# Patient Record
Sex: FEMALE | Race: WHITE
[De-identification: names, ages, dates, MRNs, and addresses within clinical notes are randomized per-mention and may not be internally consistent; named-entity substitution may affect disease eponyms.]

---

## 2020-02-17 ENCOUNTER — HOSPITAL ENCOUNTER (EMERGENCY)
Dept: HOSPITAL 88 - ER | Age: 30
Discharge: HOME | End: 2020-02-17
Payer: SELF-PAY

## 2020-02-17 VITALS — HEIGHT: 65 IN | BODY MASS INDEX: 26.99 KG/M2 | WEIGHT: 162 LBS

## 2020-02-17 DIAGNOSIS — R07.89: Primary | ICD-10-CM

## 2020-02-17 DIAGNOSIS — R01.1: ICD-10-CM

## 2020-02-17 DIAGNOSIS — K51.90: ICD-10-CM

## 2020-02-17 LAB
ALBUMIN SERPL-MCNC: 4.2 G/DL (ref 3.5–5)
ALBUMIN/GLOB SERPL: 1.4 {RATIO} (ref 0.8–2)
ALP SERPL-CCNC: 88 IU/L (ref 40–150)
ALT SERPL-CCNC: 29 IU/L (ref 0–55)
AMPHETAMINES UR QL SCN>1000 NG/ML: NEGATIVE
ANION GAP SERPL CALC-SCNC: 13.9 MMOL/L (ref 8–16)
BASOPHILS # BLD AUTO: 0.1 10*3/UL (ref 0–0.1)
BASOPHILS NFR BLD AUTO: 0.5 % (ref 0–1)
BENZODIAZ UR QL SCN: NEGATIVE
BUN SERPL-MCNC: 13 MG/DL (ref 7–26)
BUN/CREAT SERPL: 18 (ref 6–25)
CALCIUM SERPL-MCNC: 9.9 MG/DL (ref 8.4–10.2)
CHLORIDE SERPL-SCNC: 104 MMOL/L (ref 98–107)
CK MB SERPL-MCNC: 1.3 NG/ML (ref 0–5)
CK SERPL-CCNC: 98 IU/L (ref 29–168)
CO2 SERPL-SCNC: 27 MMOL/L (ref 22–29)
DEPRECATED NEUTROPHILS # BLD AUTO: 6.6 10*3/UL (ref 2.1–6.9)
EGFRCR SERPLBLD CKD-EPI 2021: > 60 ML/MIN (ref 60–?)
EOSINOPHIL # BLD AUTO: 0.1 10*3/UL (ref 0–0.4)
EOSINOPHIL NFR BLD AUTO: 1 % (ref 0–6)
ERYTHROCYTE [DISTWIDTH] IN CORD BLOOD: 12.1 % (ref 11.7–14.4)
GLOBULIN PLAS-MCNC: 3.1 G/DL (ref 2.3–3.5)
GLUCOSE SERPLBLD-MCNC: 94 MG/DL (ref 74–118)
HCT VFR BLD AUTO: 39.7 % (ref 34.2–44.1)
HGB BLD-MCNC: 13.1 G/DL (ref 12–16)
LYMPHOCYTES # BLD: 3 10*3/UL (ref 1–3.2)
LYMPHOCYTES NFR BLD AUTO: 28.8 % (ref 18–39.1)
MCH RBC QN AUTO: 29.3 PG (ref 28–32)
MCHC RBC AUTO-ENTMCNC: 33 G/DL (ref 31–35)
MCV RBC AUTO: 88.8 FL (ref 81–99)
MONOCYTES # BLD AUTO: 0.7 10*3/UL (ref 0.2–0.8)
MONOCYTES NFR BLD AUTO: 6.9 % (ref 4.4–11.3)
NEUTS SEG NFR BLD AUTO: 62.5 % (ref 38.7–80)
PCP UR QL SCN: NEGATIVE
PLATELET # BLD AUTO: 317 X10E3/UL (ref 140–360)
POTASSIUM SERPL-SCNC: 3.9 MMOL/L (ref 3.5–5.1)
RBC # BLD AUTO: 4.47 X10E6/UL (ref 3.6–5.1)
SODIUM SERPL-SCNC: 141 MMOL/L (ref 136–145)

## 2020-02-17 PROCEDURE — 82553 CREATINE MB FRACTION: CPT

## 2020-02-17 PROCEDURE — 80053 COMPREHEN METABOLIC PANEL: CPT

## 2020-02-17 PROCEDURE — 36415 COLL VENOUS BLD VENIPUNCTURE: CPT

## 2020-02-17 PROCEDURE — 85025 COMPLETE CBC W/AUTO DIFF WBC: CPT

## 2020-02-17 PROCEDURE — 82550 ASSAY OF CK (CPK): CPT

## 2020-02-17 PROCEDURE — 84484 ASSAY OF TROPONIN QUANT: CPT

## 2020-02-17 PROCEDURE — 99283 EMERGENCY DEPT VISIT LOW MDM: CPT

## 2020-02-17 PROCEDURE — 80307 DRUG TEST PRSMV CHEM ANLYZR: CPT

## 2020-02-17 PROCEDURE — 93005 ELECTROCARDIOGRAM TRACING: CPT

## 2020-02-17 NOTE — XMS REPORT
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
echo; osteomyelitis;               TECHNIQUE:                CT dose reduction 
protocol;       1.25 mm cuts were obtained through the chest during intravenous 
     infusion of contrast material;        multiplanar reconstructions were 
obtained; PE protocol;               FINDINGS:       The pulmonary arteries are 
densely enhancing and are without filling       defects.        The thoracic 
aorta is normal; no evidence of dissection or aneurysm.        There is an 
aberrant right subclavian artery, originating as last       branch from the 
thoracic aorta.       No evidence of hilar or mediastinal adenopathy;       No 
effusions.       There is an azygos lobe, which is also anatomic variant; 
otherwise,       lung windows show no parenchymal abnormalities.                
IMPRESSION:                   1.  No evidence of pulmonary embolism, aortic 
dissection or other         acute abnormalities.         2.  Aberrant right 
subclavian artery, which is an anatomic variant.                             ** 
Electronically Signed by BONILLA Marsh **           **             on 
03/10/2019 at 1739             **                      Reported and signed by: 
Alejandro Marsh M.D.          CC: Tu Waterman; Ita Serrano MD       
                                                                                
         Technologist:Rupal Bravo RT(R),CT    CTDI:        DLP:        
Trnscb Date/Time: 03/10/2019 () t.SDR.GRW                        Orig Print 
D/T: S: 03/10/2019 (857)       CTDI:          DLP:          PAGE  1            
          Signed Report                               VANCOMYCIN TROUGH
2019-03-10 00:56:00* 





                Test Item       Value           Reference Range    Comments

 

                VANCOMYCIN TROUGH (test code=VANCT)    5.2 ug/mL       10-20            





- US GUIDANCE VAS [id number] 10:45:00 Name: TIFF SCHOFIELD        
     [facility]                  : 1990 Age/S: 29  / F         
[address] [facility]                Unit #: [id number]     Loc:               
[place]  [address]               Phys: Ita Serrano MD                        
                           Acct: [id number]  Dis Date:               Status: 
ADM IN                                  PHONE #: [phone]     Exam Date: 
2019  1530                     FAX #: [phone]     Reason:            
                                       EXAMS:                                   
           CPT CODE:      [id number] US GUIDANCE VASC ACCESS                    
76351             Fluoro Time: 30         DAP (Gy m2): 41650    Air Kerma (mGy):
238.5        EXAM: Insertion of a PICC line with sonographic and fluoroscopic   
   guidance; central venography; conscious sedation;               INFORMATION: 
HISTORY of trauma and osteomyelitis of the left foot;               TECHNIQUE 
AND FINDINGS:       Conscious sedation start time: 1520 hours;       Completion 
time: 1530 hours;       Under physician supervision 2 mg of Versed were 
administered       intravenously for mild conscious sedation.       The 
patient's heart rate, blood pressure and pulse oximetry were       continuously 
monitored by a trained registered nurse.       Position face-to-face sedation 
time was 10 minutes.               Informed consent was obtained and the patient
was placed supine on the       procedure table. Her left arm was prepped and 
draped in the usual       sterile fashion. Ultrasound showed a patent and 
compressible left       basilic vein. Also known were smaller caliber but patent
brachial       veins.       Sonographic images were stored in PACS.       Xyl
ocaine was administered and using sonographic guidance the left       basilic ve
in was accessed with a micropuncture system, followed by       insertion of an 0
18 guidewire. The guidewire took an unusual turn       along the left side of th
e mediastinum suggesting a vascular       abnormality such as a persistent super
ior left vena cava. The PICC       line was trimmed to 13 cm was positioned with
its tip in the central       portion of the left subclavian vein.       Central 
venography was then performed confirming presence of a patent,       persistent 
left superior vena cava draining into the right atrium.       The PICC line was 
secured to the skin and flushed with heparinized       saline.       No complic
ations.                 IMPRESSION:         1. Successful insertion of a left ar
m PICC line using sonographic and         fluoroscopic guidance.         2. Cent
ral venography demonstrated the anatomic variant of a         persistent left gordillo
perior vena cava.                   Fluoroscopy Time: 30 sec         CAK :  238.
59 mGy         DAP :  41085 mGy sq cm         PAGE  1                       Sign
ed Report                    (CONTINUED)  Name: BRADY SCHOFIELDLAY ALVARADOLE           
  [facility]                  : 1990 Age/S: 29  / F         [address]                Unit #: [id number]     Loc:               [place]  [address]               Phys: Ita Serrano MD                                  
                 Acct: [id number]  Dis Date:               Status: ADM IN     
                            PHONE #: [phone]     Exam Date: 2019  
1530                     FAX #: [phone]     Reason:                        
                           EXAMS:                                               
CPT CODE:      [id number] US GUIDANCE VASC ACCESS                    21690       
     Fluoro Time: 30         DAP (Gy m2): 08774    Air Kerma (mGy): 238.5   <
Continued>               ** Electronically Signed by BONILLA Marsh **    
      **             on 2019 at 1045             **                      
Reported and signed by: Alejandro Marsh M.D.                                   
    CC: Ita Serrano MD                                                        
                                                               Technologist: 
Fran Harrington                                       Trnscb Date/Time: 
2019 (1248) Ciro                        Orig Print D/T: S: 2019
(6982)     PAGE  2                       Signed Report                          
    - SP FLUORO GUID CTRL ACC [id number] 10:45:00 Name: TIFF SCHOFIELD
             [facility]                  : 1990 Age/S: 29  / F  
      [address] [facility]                Unit #: [id number]     Loc:               
[place]  [address]               Phys: Ita Serrano MD                        
                           Acct: [id number]  Dis Date:               Status: 
ADM IN                                  PHONE #: [phone]     Exam Date: 
2019  1530                     FAX #: [phone]     Reason:            
                                       EXAMS:                                   
           CPT CODE:      [id number] SP FLUORO GUID CTRL ACC DEV                
32776             Fluoro Time: 30         DAP (Gy m2): 18612    Air Kerma (mGy):
238.5        EXAM: Insertion of a PICC line with sonographic and fluoroscopic   
   guidance; central venography; conscious sedation;               INFORMATION: 
HISTORY of trauma and osteomyelitis of the left foot;               TECHNIQUE 
AND FINDINGS:       Conscious sedation start time: 1520 hours;       Completion 
time: 1530 hours;       Under physician supervision 2 mg of Versed were 
administered       intravenously for mild conscious sedation.       The 
patient's heart rate, blood pressure and pulse oximetry were       continuously 
monitored by a trained registered nurse.       Position face-to-face sedation 
time was 10 minutes.               Informed consent was obtained and the patient
was placed supine on the       procedure table. Her left arm was prepped and 
draped in the usual       sterile fashion. Ultrasound showed a patent and 
compressible left       basilic vein. Also known were smaller caliber but patent
brachial       veins.       Sonographic images were stored in PACS.       Xyl
ocaine was administered and using sonographic guidance the left       basilic ve
in was accessed with a micropuncture system, followed by       insertion of an 0
18 guidewire. The guidewire took an unusual turn       along the left side of th
e mediastinum suggesting a vascular       abnormality such as a persistent super
ior left vena cava. The PICC       line was trimmed to 13 cm was positioned with
its tip in the central       portion of the left subclavian vein.       Central 
venography was then performed confirming presence of a patent,       persistent 
left superior vena cava draining into the right atrium.       The PICC line was 
secured to the skin and flushed with heparinized       saline.       No complic
ations.                 IMPRESSION:         1. Successful insertion of a left ar
m PICC line using sonographic and         fluoroscopic guidance.         2. Cent
ral venography demonstrated the anatomic variant of a         persistent left gordillo
perior vena cava.                   Fluoroscopy Time: 30 sec         CAK :  238.
59 mGy         DAP :  09578 mGy sq cm         PAGE  1                       Sign
ed Report                    (CONTINUED)  Name: TIFF SCHOFIELD           
  [facility]                  : 1990 Age/S: 29  / F         
[facility]                Unit #: [id number]     Loc:               [place]  [address]               Phys: Ita Serrano MD                                  
                 Acct: [id number]  Dis Date:               Status: ADM IN     
                            PHONE #: [phone]     Exam Date: 2019  
1530                     FAX #: [phone]     Reason:                        
                           EXAMS:                                               
CPT CODE:      19882 SP FLUORO GUID CTRL ACC DEV                11166       
     Fluoro Time: 30         DAP (Gy m2): 98898    Air Kerma (mGy): 238.5   <
Continued>               ** Electronically Signed by BONILLA Marsh **    
      **             on 2019 at 1045             **                      
Reported and signed by: Alejandro Marsh M.D.                                   
    CC: Ita Serrano MD                                                        
                                                               Technologist: 
Fran Harrington                                       Trnscb Date/Time: 
2019 (1045) tBLANCA                        Orig Print D/T: S: 2019
(1047)     PAGE  2                       Signed Report                          
    -  VENOCAVAGM [id number] 10:45:00 Name: TIFF SCHOFIELD          
   [facility]                  : 1990 Age/S: 29  / F         
 [facility]                Unit #: [id number]     Loc:               
[place]  [address]               Phys: Ita Serrano MD                        
                           Acct: [id number]  Dis Date:               Status: 
ADM IN                                  PHONE #: [phone]     Exam Date: 
2019  1530                     FAX #: [phone]     Reason:            
                                       EXAMS:                                   
           CPT CODE:      [id number] SP VENOCAVAGM SUP                          
18659             Fluoro Time: 30         DAP (Gy m2): 59208    Air Kerma (mGy):
238.5        EXAM: Insertion of a PICC line with sonographic and fluoroscopic   
   guidance; central venography; conscious sedation;               INFORMATION: 
HISTORY of trauma and osteomyelitis of the left foot;               TECHNIQUE 
AND FINDINGS:       Conscious sedation start time: 1520 hours;       Completion 
time: 1530 hours;       Under physician supervision 2 mg of Versed were 
administered       intravenously for mild conscious sedation.       The 
patient's heart rate, blood pressure and pulse oximetry were       continuously 
monitored by a trained registered nurse.       Position face-to-face sedation 
time was 10 minutes.               Informed consent was obtained and the patient
was placed supine on the       procedure table. Her left arm was prepped and 
draped in the usual       sterile fashion. Ultrasound showed a patent and 
compressible left       basilic vein. Also known were smaller caliber but patent
brachial       veins.       Sonographic images were stored in PACS.       Xyl
ocaine was administered and using sonographic guidance the left       basilic ve
in was accessed with a micropuncture system, followed by       insertion of an 0
18 guidewire. The guidewire took an unusual turn       along the left side of th
e mediastinum suggesting a vascular       abnormality such as a persistent super
ior left vena cava. The PICC       line was trimmed to 13 cm was positioned with
its tip in the central       portion of the left subclavian vein.       Central 
venography was then performed confirming presence of a patent,       persistent 
left superior vena cava draining into the right atrium.       The PICC line was 
secured to the skin and flushed with heparinized       saline.       No complic
ations.                 IMPRESSION:         1. Successful insertion of a left ar
m PICC line using sonographic and         fluoroscopic guidance.         2. Cent
ral venography demonstrated the anatomic variant of a         persistent left gordillo
perior vena cava.                   Fluoroscopy Time: 30 sec         CAK :  238.
59 mGy         DAP :  02831 mGy sq cm         PAGE  1                       Sign
ed Report                    (CONTINUED)  Name: TIFF SCHOFIELD           
  [facility]                  : 1990 Age/S: 29  / F         [address]
[facility]                Unit #: [id number]     Loc:               [place]  [address]               Phys: Ita Serrano MD                                  
                 Acct: [id number]  Dis Date:               Status: ADM IN     
                            PHONE #: [phone]     Exam Date: 2019  
1530                     FAX #: [phone]     Reason:                        
                           EXAMS:                                               
CPT CODE:      [id number] SP VENOCAVAGM SUP                          97586       
     Fluoro Time: 30         DAP (Gy m2): 94169    Air Kerma (mGy): 238.5   <
Continued>               ** Electronically Signed by BONILLA Marsh **    
      **             on 2019 at 1045             **                      
Reported and signed by: Alejandro Marsh M.D.                                   
    CC: Ita Serrano MD                                                        
                                                               Technologist: 
Fran Harrington                                       Trnscb Date/Time: 
2019 (533) Ciro                        Orig Print D/T: S: 2019
(9578)     PAGE  2                       Signed Report                          
    HCG SERUM [id number] 19:53:00* 





                Test Item       Value           Reference Range    Comments

 

                HCG SERUM BETA (test code=HCG)    < 1.0 mIU/mL    0-3             INTERPRETATION:B-HCG LEVELS <5

 SHOULD BE CONSIDERED AS "NEGATIVE." *WHEN BODERLINE RESULTS ARE 
ENCOUNTERED,PATIENT SAMPLESSHOULD BE REDRAWN 48 HOURS.***** 0-1 WEEKS AFTER 
CONCEPTION               5-50      MIU/ML1-2 WEEKS AFTER CONCEPTION             
     MIU/ML2-3 WEEKS AFTER CONCEPTION             100 -5,000  MIU/ML3-4 
WEEKS AFTER CONCEPTION             500-10,000  MIU/ML4-5 WEEKS AFTER CONCEPTION 
        1000 -50,000   MIU/ML5-6 WEEKS AFTER CONCEPTION         10,000-100,000  
MIU/ML6-8 WEEKS AFTER CONCEPTION         15,000- 200,000 MIU/ML2-3 MONTHS AFTER 
CONCEPTION         10,000-100,000 MIU/ML





IS THIS PATIENT PREGNANT? NOSED RATE [id number] 05:19:00* 





                Test Item       Value           Reference Range    Comments

 

                SED RATE ENEREN (test code=SEDW)    6 mm/hr         0-20             





SED [id number] 05:19:00* 





                Test Item       Value           Reference Range    Comments

 

                SED RATE (test code=SEDW)    6 mm/hr         0-20            WINTROBE METHOD: NORMAL RANGE FOR MEN:

 0-9 MM/HR                  WOMAN: 0-20 MM/HR





CONFIRMATION #[id number] A.LAB.FLC 19 0310 atient Location: [facility] REACTIVE 
[id number] 20:12:00* 





                Test Item       Value           Reference Range    Comments

 

                C REACTIVE PROTEIN (test code=CRP)    <0.29 mg/dL     0-0.3            





COMPREHENSIVE METABOLIC [id number] 20:12:00* 





                Test Item       Value           Reference Range    Comments

 

                SODIUM (test code=NA)    138 mmol/L      136-145          

 

                POTASSIUM (test code=K)    3.8 mmol/L      3.5-5.1          

 

                CHLORIDE (test code=CL)    103.0 mmol/L               

 

                CARBON DIOXIDE (test code=CO2)    29.0 mmol/L     21-32            

 

                ANION GAP (test code=GAP)    9.8             10-20            

 

                GLUCOSE (test code=GLU)    95 mg/dL                   

 

                BLOOD UREA NITROGEN (test code=BUN)    11 mg/dL        7-18             

 

                GLOMERULAR FILTRATION RATE (test code=GFR)    > 60 mL/min     >=60            Estimated GFR by 

using Modified MDRD formula.Chronic kidney disease is defined as either kidney 
damageor GFR <60 mL/min/1.73 m2 for >3 months.

 

                CREATININE (test code=CREAT)    0.60 mg/dL      0.55-1.02       **Note change in reference 

range due to change in reagent.**

 

                BUN/CREATININE RATIO (test code=BUN/CREA)    17.3            10-20            

 

                TOTAL PROTEIN (test code=PROT)    7.4 gram/dL     6.4-8.2          

 

                ALBUMIN (test code=ALB)    3.5 g/dL        3.4-5.0          

 

                GLOBULIN (test code=GLOB)    3.9 gram/dL     2.7-4.2          

 

                ALBUMIN/GLOBULIN RATIO (test code=A/G)    0.9             0.75-1.50        

 

                CALCIUM (test code=CA)    8.9 mg/dL       8.5-10.1         

 

                BILIRUBIN TOTAL (test code=BILT)    0.20 mg/dL      0.0-1.0          

 

                SGOT/AST (test code=AST)    10 IUnit/L      15-37            

 

                SGPT/ALT (test code=ALT)    18 IUnit/L      12-78            

 

                ALKALINE PHOSPHATASE TOTAL (test code=ALKP)    98 IUnit/L                **Note change in

 reference range due to change in reagent.**





COMPREHENSIVE METABOLIC [id number] 20:05:00* 





                Test Item       Value           Reference Range    Comments

 

                SODIUM (test code=NA)    138 mmol/L      136-145          

 

                POTASSIUM (test code=K)    3.8 mmol/L      3.5-5.1          

 

                CHLORIDE (test code=CL)    103.0 mmol/L               

 

                CARBON DIOXIDE (test code=CO2)     mmol/L         21-32            

 

                ANION GAP (test code=GAP)                    10-20            

 

                GLUCOSE (test code=GLU)     mg/dL                     

 

                BLOOD UREA NITROGEN (test code=BUN)     mg/dL          7-18             

 

                GLOMERULAR FILTRATION RATE (test code=GFR)     mL/min         >=60             

 

                CREATININE (test code=CREAT)     mg/dL          0.55-1.02        

 

                BUN/CREATININE RATIO (test code=BUN/CREA)                    10-20            

 

                TOTAL PROTEIN (test code=PROT)     gram/dL        6.4-8.2          

 

                ALBUMIN (test code=ALB)     g/dL           3.4-5.0          

 

                GLOBULIN (test code=GLOB)     gram/dL        2.7-4.2          

 

                ALBUMIN/GLOBULIN RATIO (test code=A/G)                    0.75-1.50        

 

                CALCIUM (test code=CA)     mg/dL          8.5-10.1         

 

                BILIRUBIN TOTAL (test code=BILT)     mg/dL          0.0-1.0          

 

                SGOT/AST (test code=AST)     IUnit/L        15-37            

 

                SGPT/ALT (test code=ALT)     IUnit/L        12-78            

 

                ALKALINE PHOSPHATASE TOTAL (test code=ALKP)     IUnit/L                   





CBC W/AUTO [id number] 19:45:00* 





                Test Item       Value           Reference Range    Comments

 

                WHITE BLOOD CELL (test code=WBC)    10.3 K/mm3      4.5-12.5         

 

                RED BLOOD CELL (test code=RBC)    4.55 mill/mm3    3.7-5.2          

 

                HEMOGLOBIN (test code=HGB)    13.2 gram/dL    11.5-15.5        

 

                HEMATOCRIT (test code=HCT)    41.4 %          36.0-46.0        

 

                MEAN CELL VOLUME (test code=MCV)    91.0 fL         80-98            

 

                MEAN CELL HGB (test code=MCH)    29.0 picogram    27.0-33.0        

 

                MEAN CELL HGB CONCETRATION (test code=MCHC)    31.9 gram/dL    33.0-36.0        

 

                RED CELL DISTRIBUTION WIDTH (test code=RDW)    13.0 %          11.6-16.2        

 

                RED CELL DISTRIBUTION WIDTH SD (test code=RDW-SD)    43.1 fL         37.0-51.0        

 

                PLATELET COUNT (test code=PLT)    253 K/mm3       150-450          

 

                MEAN PLATELET VOLUME (test code=MPV)    10.9 fL         6.7-11.0         

 

                NEUTROPHIL % (test code=NT%)    64.3 %          39.0-69.0        

 

                IMMATURE GRANULOCYTE % (test code=IG%)    0.5 %           0.0-5.0          

 

                LYMPHOCYTE % (test code=LY%)    26.5 %          25.0-55.0        

 

                MONOCYTE % (test code=MO%)    7.1 %           0.0-10.0         

 

                EOSINOPHIL % (test code=EO%)    1.1 %           0.0-5.0          

 

                BASOPHIL % (test code=BA%)    0.5 %           0.0-1.0          

 

                NUCLEATED RBC % (test code=NRBC%)    0.0 %           0-0              

 

                NEUTROPHIL # (test code=NT#)    6.66 K/mm3      1.8-7.7          

 

                IMMATURE GRANULOCYTE # (test code=IG#)    0.05 x10 3/uL    0-0.03           

 

                LYMPHOCYTE # (test code=LY#)    2.74 K/mm3      1.0-5.0          

 

                MONOCYTE # (test code=MO#)    0.73 K/mm3      0-0.8            

 

                EOSINOPHIL # (test code=EO#)    0.11 K/mm3      0.0-0.5          

 

                BASOPHIL # (test code=BA#)    0.05 K/mm3      0.0-0.2          

 

                NUCLEATED RBC # (test code=NRBC#)    0.00 K/mm3      0.0-0.1          





CBC W/AUTO [id number] 19:44:00* 





                Test Item       Value           Reference Range    Comments

 

                WHITE BLOOD CELL (test code=WBC)     K/mm3          4.5-12.5         

 

                RED BLOOD CELL (test code=RBC)     mill/mm3       3.7-5.2          

 

                HEMOGLOBIN (test code=HGB)    13.2 gram/dL    11.5-15.5        

 

                HEMATOCRIT (test code=HCT)    41.4 %          36.0-46.0        

 

                MEAN CELL VOLUME (test code=MCV)     fL             80-98            

 

                MEAN CELL HGB (test code=MCH)     picogram       27.0-33.0        

 

                MEAN CELL HGB CONCETRATION (test code=MCHC)     gram/dL        33.0-36.0        

 

                RED CELL DISTRIBUTION WIDTH (test code=RDW)     %              11.6-16.2        

 

                RED CELL DISTRIBUTION WIDTH SD (test code=RDW-SD)     fL             37.0-51.0        

 

                PLATELET COUNT (test code=PLT)     K/mm3          150-450          

 

                MEAN PLATELET VOLUME (test code=MPV)     fL             6.7-11.0         

 

                NEUTROPHIL % (test code=NT%)     %              39.0-69.0        

 

                IMMATURE GRANULOCYTE % (test code=IG%)     %              0.0-5.0          

 

                LYMPHOCYTE % (test code=LY%)     %              25.0-55.0        

 

                MONOCYTE % (test code=MO%)     %              0.0-10.0         

 

                EOSINOPHIL %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